# Patient Record
Sex: MALE | Race: WHITE | NOT HISPANIC OR LATINO | Employment: FULL TIME | URBAN - METROPOLITAN AREA
[De-identification: names, ages, dates, MRNs, and addresses within clinical notes are randomized per-mention and may not be internally consistent; named-entity substitution may affect disease eponyms.]

---

## 2021-01-04 ENCOUNTER — OFFICE VISIT (OUTPATIENT)
Dept: SLEEP MEDICINE | Facility: CLINIC | Age: 31
End: 2021-01-04
Payer: COMMERCIAL

## 2021-01-04 VITALS
BODY MASS INDEX: 29.63 KG/M2 | HEART RATE: 73 BPM | HEIGHT: 70 IN | DIASTOLIC BLOOD PRESSURE: 84 MMHG | WEIGHT: 207 LBS | SYSTOLIC BLOOD PRESSURE: 130 MMHG

## 2021-01-04 DIAGNOSIS — G47.33 OSA (OBSTRUCTIVE SLEEP APNEA): Primary | ICD-10-CM

## 2021-01-04 PROCEDURE — 99204 OFFICE O/P NEW MOD 45 MIN: CPT | Mod: S$GLB,,, | Performed by: INTERNAL MEDICINE

## 2021-01-04 PROCEDURE — 99999 PR PBB SHADOW E&M-NEW PATIENT-LVL III: ICD-10-PCS | Mod: PBBFAC,,, | Performed by: INTERNAL MEDICINE

## 2021-01-04 PROCEDURE — 1126F AMNT PAIN NOTED NONE PRSNT: CPT | Mod: S$GLB,,, | Performed by: INTERNAL MEDICINE

## 2021-01-04 PROCEDURE — 3008F BODY MASS INDEX DOCD: CPT | Mod: CPTII,S$GLB,, | Performed by: INTERNAL MEDICINE

## 2021-01-04 PROCEDURE — 3008F PR BODY MASS INDEX (BMI) DOCUMENTED: ICD-10-PCS | Mod: CPTII,S$GLB,, | Performed by: INTERNAL MEDICINE

## 2021-01-04 PROCEDURE — 99999 PR PBB SHADOW E&M-NEW PATIENT-LVL III: CPT | Mod: PBBFAC,,, | Performed by: INTERNAL MEDICINE

## 2021-01-04 PROCEDURE — 99204 PR OFFICE/OUTPT VISIT, NEW, LEVL IV, 45-59 MIN: ICD-10-PCS | Mod: S$GLB,,, | Performed by: INTERNAL MEDICINE

## 2021-01-04 PROCEDURE — 1126F PR PAIN SEVERITY QUANTIFIED, NO PAIN PRESENT: ICD-10-PCS | Mod: S$GLB,,, | Performed by: INTERNAL MEDICINE

## 2021-01-04 RX ORDER — FINASTERIDE 1 MG/1
TABLET, FILM COATED ORAL
COMMUNITY
Start: 2020-12-31

## 2021-01-06 ENCOUNTER — PATIENT MESSAGE (OUTPATIENT)
Dept: SLEEP MEDICINE | Facility: CLINIC | Age: 31
End: 2021-01-06

## 2021-01-07 ENCOUNTER — TELEPHONE (OUTPATIENT)
Dept: SLEEP MEDICINE | Facility: OTHER | Age: 31
End: 2021-01-07

## 2021-01-08 ENCOUNTER — PATIENT MESSAGE (OUTPATIENT)
Dept: SLEEP MEDICINE | Facility: CLINIC | Age: 31
End: 2021-01-08

## 2021-01-08 ENCOUNTER — TELEPHONE (OUTPATIENT)
Dept: SLEEP MEDICINE | Facility: OTHER | Age: 31
End: 2021-01-08

## 2021-01-22 ENCOUNTER — PATIENT MESSAGE (OUTPATIENT)
Dept: SLEEP MEDICINE | Facility: CLINIC | Age: 31
End: 2021-01-22

## 2021-01-22 ENCOUNTER — TELEPHONE (OUTPATIENT)
Dept: SLEEP MEDICINE | Facility: OTHER | Age: 31
End: 2021-01-22

## 2021-01-25 ENCOUNTER — HOSPITAL ENCOUNTER (OUTPATIENT)
Dept: SLEEP MEDICINE | Facility: OTHER | Age: 31
Discharge: HOME | End: 2021-01-25
Attending: INTERNAL MEDICINE
Payer: COMMERCIAL

## 2021-01-25 DIAGNOSIS — G47.33 OSA (OBSTRUCTIVE SLEEP APNEA): ICD-10-CM

## 2021-01-25 PROCEDURE — 95800 SLP STDY UNATTENDED: CPT

## 2021-01-29 ENCOUNTER — TELEPHONE (OUTPATIENT)
Dept: SLEEP MEDICINE | Facility: CLINIC | Age: 31
End: 2021-01-29

## 2021-02-01 ENCOUNTER — TELEPHONE (OUTPATIENT)
Dept: SLEEP MEDICINE | Facility: CLINIC | Age: 31
End: 2021-02-01

## 2021-02-01 DIAGNOSIS — G47.33 OSA (OBSTRUCTIVE SLEEP APNEA): Primary | ICD-10-CM

## 2021-03-30 ENCOUNTER — OFFICE VISIT (OUTPATIENT)
Dept: SLEEP MEDICINE | Facility: CLINIC | Age: 31
End: 2021-03-30
Payer: COMMERCIAL

## 2021-03-30 VITALS
DIASTOLIC BLOOD PRESSURE: 85 MMHG | WEIGHT: 207 LBS | BODY MASS INDEX: 29.63 KG/M2 | HEIGHT: 70 IN | SYSTOLIC BLOOD PRESSURE: 123 MMHG | HEART RATE: 67 BPM

## 2021-03-30 DIAGNOSIS — G47.33 OSA (OBSTRUCTIVE SLEEP APNEA): Primary | ICD-10-CM

## 2021-03-30 DIAGNOSIS — G47.10 HYPERSOMNOLENCE: ICD-10-CM

## 2021-03-30 DIAGNOSIS — J30.9 ALLERGIC SINUSITIS: ICD-10-CM

## 2021-03-30 PROCEDURE — 1126F PR PAIN SEVERITY QUANTIFIED, NO PAIN PRESENT: ICD-10-PCS | Mod: S$GLB,,, | Performed by: INTERNAL MEDICINE

## 2021-03-30 PROCEDURE — 3008F BODY MASS INDEX DOCD: CPT | Mod: CPTII,S$GLB,, | Performed by: INTERNAL MEDICINE

## 2021-03-30 PROCEDURE — 3008F PR BODY MASS INDEX (BMI) DOCUMENTED: ICD-10-PCS | Mod: CPTII,S$GLB,, | Performed by: INTERNAL MEDICINE

## 2021-03-30 PROCEDURE — 99214 OFFICE O/P EST MOD 30 MIN: CPT | Mod: S$GLB,,, | Performed by: INTERNAL MEDICINE

## 2021-03-30 PROCEDURE — 99214 PR OFFICE/OUTPT VISIT, EST, LEVL IV, 30-39 MIN: ICD-10-PCS | Mod: S$GLB,,, | Performed by: INTERNAL MEDICINE

## 2021-03-30 PROCEDURE — 99999 PR PBB SHADOW E&M-EST. PATIENT-LVL III: ICD-10-PCS | Mod: PBBFAC,,, | Performed by: INTERNAL MEDICINE

## 2021-03-30 PROCEDURE — 99999 PR PBB SHADOW E&M-EST. PATIENT-LVL III: CPT | Mod: PBBFAC,,, | Performed by: INTERNAL MEDICINE

## 2021-03-30 PROCEDURE — 1126F AMNT PAIN NOTED NONE PRSNT: CPT | Mod: S$GLB,,, | Performed by: INTERNAL MEDICINE

## 2021-04-16 ENCOUNTER — PATIENT MESSAGE (OUTPATIENT)
Dept: RESEARCH | Facility: HOSPITAL | Age: 31
End: 2021-04-16

## 2024-10-30 ENCOUNTER — TELEPHONE (OUTPATIENT)
Dept: OBSTETRICS AND GYNECOLOGY | Facility: CLINIC | Age: 34
End: 2024-10-30
Payer: COMMERCIAL

## 2024-10-30 DIAGNOSIS — R86.8 ASTHENOSPERMIA: Primary | ICD-10-CM

## 2024-11-01 NOTE — PROGRESS NOTES
The patient location is: [place]  The chief complaint leading to consultation is: trouble with sleep    Visit type: audiovisual    10 minutes of total time spent on the encounter, which includes face to face time and non-face to face time preparing to see the patient (eg, review of tests), Obtaining and/or reviewing separately obtained history, Documenting clinical information in the electronic or other health record, Independently interpreting results (not separately reported) and communicating results to the patient/family/caregiver, or Care coordination (not separately reported).     Each patient to whom he or she provides medical services by telemedicine is:  (1) informed of the relationship between the physician and patient and the respective role of any other health care provider with respect to management of the patient; and (2) notified that he or she may decline to receive medical services by telemedicine and may withdraw from such care at any time.      FOLLOW-UP VISIT    CC: CESAR follow-up.     Norm Harp  is a pleasant 33 y.o. male established with the [facility]    Here today for:  follow-up     Since last visit:   See assessment below    SLEEP SCHEDULE   Duration    Wind- down    Envmnt    CBTi    Meds prior    Meds now    Bed Time 11p (BT 2AM)   Lights out    Latency 30min   Arousals 1-2   Back to sleep    Stim. ctrl    Wake time 9AM   Caffeine    Naps Occasional in afternoon   Nocturia 0-1   Work                  No past medical history on file.  Patient Active Problem List   Diagnosis    CESAR (obstructive sleep apnea)       Current Outpatient Medications:     finasteride (PROPECIA) 1 mg tablet, , Disp: , Rfl:      There were no vitals filed for this visit.  Physical Exam:    GEN:   Well-appearing  Psych:  Appropriate affect, demonstrates insight  SKIN:  No rash on the face or bridge of the nose      LABS:   Lab Results   Component Value Date    CO2 26 06/03/2024         RECORDS  "REVIEWED:      HST 1.25.2021: AHI 10, RDI =20, <90% 0.2%  Started auto 6-12:    DL 3.29.21: 28/30, 7h 16min, auto 6-12 (8.1/10.6/11.6), leak 16.5, AHI 2.7    ASSESSMENT    Sig PMH:  PROBLEM DESCRIPTION/ Sx on Presentation Interval HX STATUS PLAN     CESAR   Presentation:  resented Jan 2021 for evaluation for witnessed snoring and apneic events    Doing well, wearing nightly, more rested.      PAP history   Dx Study    Mask Nasal pillows   DME HME   My Air    CPAP age 2/12/21 compliant AV!   PAP altn    Benefits Sleeping better    CPAP making a "huge difference"    No daytime drowsiness anymore   PROBS none            10.30.24: 30/30 x 6h 42min, 8-14 (9.8/11.7/12.8), leak 3/9/19, AHI 0.8    Using nightly, sleeps much better with CPAP     controlled       PAP PLAN   E min 8 cwp (incr to 10)   I max 14 cwp ()   PS/epr    RAMP 6 x 20min (incr to 8 x 10)   Other    Altn.        Supplies ordered        The patient is using and benefitting from PAP therapy     Other issues: allergic rhinitis (controlled with prn antihistamine)    RTC:  yearly or sooner if problems arise        "

## 2024-11-06 ENCOUNTER — OFFICE VISIT (OUTPATIENT)
Dept: SLEEP MEDICINE | Facility: CLINIC | Age: 34
End: 2024-11-06
Payer: COMMERCIAL

## 2024-11-06 DIAGNOSIS — G47.33 OSA (OBSTRUCTIVE SLEEP APNEA): Primary | ICD-10-CM

## 2024-11-06 PROCEDURE — 99213 OFFICE O/P EST LOW 20 MIN: CPT | Mod: 95,,, | Performed by: INTERNAL MEDICINE

## 2024-11-29 ENCOUNTER — TELEPHONE (OUTPATIENT)
Dept: OBSTETRICS AND GYNECOLOGY | Facility: CLINIC | Age: 34
End: 2024-11-29
Payer: COMMERCIAL

## 2024-11-29 DIAGNOSIS — R86.9 ABNORMAL SEMEN ANALYSIS: Primary | ICD-10-CM

## 2024-11-29 NOTE — TELEPHONE ENCOUNTER
Semen analysis abnormal x 2 with recommendation for urology consult. Referral placed after reviewed results/recommendations with spouse.

## 2024-12-12 ENCOUNTER — OFFICE VISIT (OUTPATIENT)
Dept: UROLOGY | Facility: CLINIC | Age: 34
End: 2024-12-12
Payer: COMMERCIAL

## 2024-12-12 VITALS
BODY MASS INDEX: 32.07 KG/M2 | DIASTOLIC BLOOD PRESSURE: 80 MMHG | HEIGHT: 70 IN | SYSTOLIC BLOOD PRESSURE: 120 MMHG | WEIGHT: 224 LBS

## 2024-12-12 DIAGNOSIS — E29.1 TESTICULAR FAILURE: ICD-10-CM

## 2024-12-12 PROCEDURE — 1159F MED LIST DOCD IN RCRD: CPT | Mod: CPTII,S$GLB,, | Performed by: UROLOGY

## 2024-12-12 PROCEDURE — 1160F RVW MEDS BY RX/DR IN RCRD: CPT | Mod: CPTII,S$GLB,, | Performed by: UROLOGY

## 2024-12-12 PROCEDURE — 3079F DIAST BP 80-89 MM HG: CPT | Mod: CPTII,S$GLB,, | Performed by: UROLOGY

## 2024-12-12 PROCEDURE — 99204 OFFICE O/P NEW MOD 45 MIN: CPT | Mod: S$GLB,,, | Performed by: UROLOGY

## 2024-12-12 PROCEDURE — 3074F SYST BP LT 130 MM HG: CPT | Mod: CPTII,S$GLB,, | Performed by: UROLOGY

## 2024-12-12 PROCEDURE — 99999 PR PBB SHADOW E&M-EST. PATIENT-LVL III: CPT | Mod: PBBFAC,,, | Performed by: UROLOGY

## 2024-12-12 PROCEDURE — 3008F BODY MASS INDEX DOCD: CPT | Mod: CPTII,S$GLB,, | Performed by: UROLOGY

## 2024-12-12 NOTE — LETTER
December 12, 2024        Rani Seaman MD  [address]  [address]  [place] [address]             [facility]  [address]  [place] [address]  Phone: [phone]   Patient: Norm Harp   MR Number: [id number]   YOB: 1990   Date of Visit: 12/12/2024       Dear Dr. Seaman:    Thank you for referring Norm Harp to me for evaluation. Attached you will find relevant portions of my assessment and plan of care.    If you have questions, please do not hesitate to call me. I look forward to following Norm Harp along with you.    Sincerely,      Manny Callahan MD            CC  No Recipients    Enclosure

## 2024-12-12 NOTE — PROGRESS NOTES
"Chief Complaint:  Infertility    HPI:    Mr. Harp is a 33 y.o.  male who has been  to his wife for the past 2 years. They have been trying to achieve a pregnancy for the past 1 years but without success. 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 He denies a history of erectile dysfunction and ejaculatory problems.    He has achieved 0 pregnancies in the past.    HORMONE PANEL 12/10/24  T--248  FSH--2.0  LH--2.9    SA 10/28/24 (ANGELITO)--1.6 cc/71 million per cc/38%/4%  SA 24 (ANGELITO)--1.3 cc/82 million per cc/60.5%/4%    Linda Harp is 30 years old. ( [birth year]) Her menses are regular. She has not undergone prior hysterosalpingogram. She has achieved 0 prior pregnancies.  She sees Dr. Seaman.    The couple has not undergone prior intrauterine insemination procedures.    The couple has not undergone prior in-vitro fertilization procedures.    Norm Harp denies a history of exposure to harmful chemicals, toxins, and radiation.    No history of recent fevers greater than 101.5 degrees Farenheit.    +  history of recent exposure to "wet heat." + hot tub    No history of urological trauma or testicular torsion.    No history of prostatitis, epididymitis, and orchitis.    No history of post-pubertal mumps.    There is no known family history of fertility problems.    REVIEW OF SYSTEMS:     He denies headache, blurred vision, fever, nausea, vomiting, chills, abdominal pain, chest pain, sore throat, bleeding per rectum, cough, SOB, recent loss of consciousness, recent mental status changes, seizures, dizziness, or upper or lower extremity weakness.    PHYSICAL EXAM:     The patient generally appears in good health, is appropriately interactive, and is in no apparent distress.     Eyes: anicteric sclerae, moist conjunctivae; no lid-lag; PERRLA     HENT: Atraumatic; oropharynx clear with moist mucous membranes and no " "mucosal ulcerations;normal hard and soft palate.  No evidence of lymphadenopathy.    Neck: Trachea midline.  No thyromegaly.    Skin: No lesions.    Mental: Cooperative with normal affect.  Is oriented to time, place, and person.    Neuro: Grossly intact.    Chest: Normal inspiratory effort.   No accessory muscles.  No audible wheezes.  Respirations symmetric on inspiration and expiration.    Heart: Regular rhythm.      Abdomen:  Soft, non-tender. No masses or organomegaly. Bladder is not palpable. No evidence of flank discomfort. No evidence of inguinal hernia.    Genitourinary: Penis is normal with no evidence of plaques or induration. Urethral meatus is normal. Scrotum is normal. Testes are descended bilaterally with no evidence of abnormal masses or tenderness. Epididymis, vas deferens, and cord structures are normal bilaterally.  Testicular volume is approximately 18 cc bilaterally.    Extremities: No cyanosis, clubbing, or edema.    IMPRESSION & PLAN:    Mr. Harp is a 33 y.o.  male who has been  to his wife for the past 2 years. They have been trying to achieve a pregnancy for the past 1 years but without success. 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 He denies a history of erectile dysfunction and ejaculatory problems.    He has achieved 0 pregnancies in the past.    HORMONE PANEL 12/10/24  T--248  FSH--2.0  LH--2.9    SA 10/28/24 (ANGELITO)--1.6 cc/71 million per cc/38%/4%  SA 11/20/24 (ANGELITO)--1.3 cc/82 million per cc/60.5%/4%    1.   testosterone, prolactin, and estradiol serum levels in the AM.  2.  Independently interpreted his labs and outside SA's today.   3.  Return to the clinic in 1 weeks to discuss test results and treatment plan.  4.  Recommend avoiding "wet heat."  5.  Recommend taking a multivitamin and 500 mg of vitamin c daily in addition to the multivitamin.  6.  Please send a copy of the note to " Dr. Seaman.  Thank you for the consultation.    CC: Jurgen

## 2024-12-16 ENCOUNTER — PATIENT MESSAGE (OUTPATIENT)
Dept: UROLOGY | Facility: CLINIC | Age: 34
End: 2024-12-16
Payer: COMMERCIAL

## 2024-12-17 ENCOUNTER — LAB VISIT (OUTPATIENT)
Dept: LAB | Facility: HOSPITAL | Age: 34
End: 2024-12-17
Attending: UROLOGY
Payer: COMMERCIAL

## 2024-12-17 DIAGNOSIS — E29.1 TESTICULAR FAILURE: ICD-10-CM

## 2024-12-17 LAB
ESTRADIOL SERPL-MCNC: 35 PG/ML (ref 11–44)
PROLACTIN SERPL IA-MCNC: 7.7 NG/ML (ref 3.5–19.4)
TESTOST SERPL-MCNC: 286 NG/DL (ref 304–1227)

## 2024-12-17 PROCEDURE — 36415 COLL VENOUS BLD VENIPUNCTURE: CPT | Performed by: UROLOGY

## 2024-12-17 PROCEDURE — 84403 ASSAY OF TOTAL TESTOSTERONE: CPT | Performed by: UROLOGY

## 2024-12-17 PROCEDURE — 82670 ASSAY OF TOTAL ESTRADIOL: CPT | Performed by: UROLOGY

## 2024-12-17 PROCEDURE — 84146 ASSAY OF PROLACTIN: CPT | Performed by: UROLOGY

## 2024-12-19 ENCOUNTER — OFFICE VISIT (OUTPATIENT)
Dept: UROLOGY | Facility: CLINIC | Age: 34
End: 2024-12-19
Payer: COMMERCIAL

## 2024-12-19 VITALS
HEART RATE: 94 BPM | DIASTOLIC BLOOD PRESSURE: 79 MMHG | SYSTOLIC BLOOD PRESSURE: 124 MMHG | WEIGHT: 224.19 LBS | BODY MASS INDEX: 32.17 KG/M2

## 2024-12-19 DIAGNOSIS — E29.1 TESTICULAR FAILURE: Primary | ICD-10-CM

## 2024-12-19 PROCEDURE — 3008F BODY MASS INDEX DOCD: CPT | Mod: CPTII,S$GLB,, | Performed by: UROLOGY

## 2024-12-19 PROCEDURE — 3074F SYST BP LT 130 MM HG: CPT | Mod: CPTII,S$GLB,, | Performed by: UROLOGY

## 2024-12-19 PROCEDURE — 99999 PR PBB SHADOW E&M-EST. PATIENT-LVL III: CPT | Mod: PBBFAC,,, | Performed by: UROLOGY

## 2024-12-19 PROCEDURE — 3078F DIAST BP <80 MM HG: CPT | Mod: CPTII,S$GLB,, | Performed by: UROLOGY

## 2024-12-19 PROCEDURE — 99214 OFFICE O/P EST MOD 30 MIN: CPT | Mod: S$GLB,,, | Performed by: UROLOGY

## 2024-12-19 PROCEDURE — 1159F MED LIST DOCD IN RCRD: CPT | Mod: CPTII,S$GLB,, | Performed by: UROLOGY

## 2024-12-19 RX ORDER — CLOMIPHENE CITRATE 50 MG/1
TABLET ORAL
Qty: 15 TABLET | Refills: 5 | Status: SHIPPED | OUTPATIENT
Start: 2024-12-19

## 2024-12-19 NOTE — PROGRESS NOTES
"Chief Complaint:  Infertility    HPI:    Mr. Harp is a 34 y.o.  male who has been  to his wife for the past 2 years. They have been trying to achieve a pregnancy for the past 1 years but without success. 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 He denies a history of erectile dysfunction and ejaculatory problems.      Lab Results   Component Value Date    TOTALTESTOST 286 (L) 2024    ESTRADIOL 35 2024    PROLACTIN 7.7 2024    HORMONE PANEL 12/10/24  T--248  FSH--2.0  LH--2.9    SA 10/28/24 (ANGELITO)--1.6 cc/71 million per cc/38%/4%  SA 24 (ANGELITO)--1.3 cc/82 million per cc/60.5%/4%    FOR REVIEW FROM PREVIOUS:    Mr. Harp is a 33 y.o.  male who has been  to his wife for the past 2 years. They have been trying to achieve a pregnancy for the past 1 years but without success. 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 He denies a history of erectile dysfunction and ejaculatory problems.    He has achieved 0 pregnancies in the past.    HORMONE PANEL 12/10/24  T--248  FSH--2.0  LH--2.9    SA 10/28/24 (ANGELITO)--1.6 cc/71 million per cc/38%/4%  SA 24 (ANGELITO)--1.3 cc/82 million per cc/60.5%/4%    Linda Harp is 30 years old. ( 89) Her menses are regular. She has not undergone prior hysterosalpingogram. She has achieved 0 prior pregnancies.  She sees Dr. Seaman.    The couple has not undergone prior intrauterine insemination procedures.    The couple has not undergone prior in-vitro fertilization procedures.    Norm Harp denies a history of exposure to harmful chemicals, toxins, and radiation.    No history of recent fevers greater than 101.5 degrees Farenheit.    +  history of recent exposure to "wet heat." + hot tub    No history of urological trauma or testicular torsion.    No history of " prostatitis, epididymitis, and orchitis.    No history of post-pubertal mumps.    There is no known family history of fertility problems.    REVIEW OF SYSTEMS:     He denies headache, blurred vision, fever, nausea, vomiting, chills, abdominal pain, chest pain, sore throat, bleeding per rectum, cough, SOB, recent loss of consciousness, recent mental status changes, seizures, dizziness, or upper or lower extremity weakness.    PHYSICAL EXAM:     The patient generally appears in good health, is appropriately interactive, and is in no apparent distress.     Eyes: anicteric sclerae, moist conjunctivae; no lid-lag; PERRLA     HENT: Atraumatic; oropharynx clear with moist mucous membranes and no mucosal ulcerations;normal hard and soft palate.  No evidence of lymphadenopathy.    Neck: Trachea midline.  No thyromegaly.    Skin: No lesions.    Mental: Cooperative with normal affect.  Is oriented to time, place, and person.    Neuro: Grossly intact.    Chest: Normal inspiratory effort.   No accessory muscles.  No audible wheezes.  Respirations symmetric on inspiration and expiration.    Heart: Regular rhythm.      Abdomen:  Soft, non-tender. No masses or organomegaly. Bladder is not palpable. No evidence of flank discomfort. No evidence of inguinal hernia.    Genitourinary: Penis is normal with no evidence of plaques or induration. Urethral meatus is normal. Scrotum is normal. Testes are descended bilaterally with no evidence of abnormal masses or tenderness. Epididymis, vas deferens, and cord structures are normal bilaterally.  Testicular volume is approximately 18 cc bilaterally.    Extremities: No cyanosis, clubbing, or edema.    IMPRESSION & PLAN:    Mr. Harp is a 33 y.o.  male who has been  to his wife for the past 2 years. They have been trying to achieve a pregnancy for the past 1 years but without success. 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 He denies a history of erectile dysfunction and ejaculatory problems.    He has achieved 0 pregnancies in the past.    HORMONE PANEL 12/10/24  T--248  FSH--2.0  LH--2.9    SA 10/28/24 (ANGELITO)--1.6 cc/71 million per cc/38%/4%  SA 11/20/24 (ANGELITO)--1.3 cc/82 million per cc/60.5%/4%    1.   Independently interpreted his labs and outside SA's today.   2.  His T is low.  Will start clomid. Side effects discussed.  A new Rx was given.  Will check a T in 1 month.  3. RTC 3 months with a SA x 1.  4.  Recommend avoiding "wet heat."  5.  Recommend taking a multivitamin and 500 mg of vitamin c daily in addition to the multivitamin.      CC: Jurgen        "

## 2024-12-19 NOTE — LETTER
December 19, 2024        Rani Seaman MD  [address]  [address]  [place] [address]             [facility]th [facility]  [address]  [place] [address]  Phone: [phone]   Patient: Norm Harp   MR Number: [id number]   YOB: 1990   Date of Visit: 12/19/2024       Dear Dr. Seaman:    Thank you for referring Norm Harp to me for evaluation. Attached you will find relevant portions of my assessment and plan of care.    If you have questions, please do not hesitate to call me. I look forward to following Norm Harp along with you.    Sincerely,      Manny Callahan MD            CC  No Recipients    Enclosure

## 2024-12-23 ENCOUNTER — TELEPHONE (OUTPATIENT)
Dept: UROLOGY | Facility: CLINIC | Age: 34
End: 2024-12-23
Payer: COMMERCIAL

## 2024-12-23 NOTE — TELEPHONE ENCOUNTER
Call placed to pt regarding prescription. Pt stated he no longer needs to switch pharmacies, as he is back in town.     ----- Message from Med Assistant Gamboa sent at 12/20/2024 10:13 AM CST -----  Regarding: FW: Medication Issue  Contact: IVETTE BUI [[id number]]    ----- Message -----  From: Jodi Quiñonez  Sent: 12/20/2024   9:52 AM CST  To: [facility]  Subject: Medication Issue                                 CONSULT/ADVISORY    Name of Caller: IVETTE BUI [[id number]]    Contact Preference: [phone] (home)       Nature of Call:  Pt states Dr. Callahan sent a RX to his preferred  Pharmacy and they said it was out of stock.  Pt wants it to be resent to:    [facility]  [address]  [place]  [address]  [phone]

## 2025-01-27 ENCOUNTER — PATIENT MESSAGE (OUTPATIENT)
Dept: UROLOGY | Facility: CLINIC | Age: 35
End: 2025-01-27
Payer: COMMERCIAL

## 2025-01-28 ENCOUNTER — LAB VISIT (OUTPATIENT)
Dept: LAB | Facility: HOSPITAL | Age: 35
End: 2025-01-28
Attending: UROLOGY
Payer: COMMERCIAL

## 2025-01-28 ENCOUNTER — PATIENT MESSAGE (OUTPATIENT)
Dept: UROLOGY | Facility: CLINIC | Age: 35
End: 2025-01-28
Payer: COMMERCIAL

## 2025-01-28 ENCOUNTER — TELEPHONE (OUTPATIENT)
Dept: UROLOGY | Facility: CLINIC | Age: 35
End: 2025-01-28
Payer: COMMERCIAL

## 2025-01-28 DIAGNOSIS — E29.1 TESTICULAR FAILURE: ICD-10-CM

## 2025-01-28 LAB — TESTOST SERPL-MCNC: 603 NG/DL (ref 304–1227)

## 2025-01-28 PROCEDURE — 36415 COLL VENOUS BLD VENIPUNCTURE: CPT | Performed by: UROLOGY

## 2025-01-28 PROCEDURE — 84403 ASSAY OF TOTAL TESTOSTERONE: CPT | Performed by: UROLOGY

## 2025-01-28 NOTE — TELEPHONE ENCOUNTER
Call was placed back to patient no answer left VM.       Please notify T looks good.  Continue clomid.  RTC as scheduled.

## 2025-02-25 ENCOUNTER — PATIENT MESSAGE (OUTPATIENT)
Dept: UROLOGY | Facility: CLINIC | Age: 35
End: 2025-02-25
Payer: COMMERCIAL

## 2025-03-20 ENCOUNTER — OFFICE VISIT (OUTPATIENT)
Dept: UROLOGY | Facility: CLINIC | Age: 35
End: 2025-03-20
Payer: COMMERCIAL

## 2025-03-20 VITALS
WEIGHT: 214.5 LBS | SYSTOLIC BLOOD PRESSURE: 126 MMHG | DIASTOLIC BLOOD PRESSURE: 85 MMHG | HEART RATE: 75 BPM | BODY MASS INDEX: 30.78 KG/M2

## 2025-03-20 DIAGNOSIS — E29.1 TESTICULAR FAILURE: Primary | ICD-10-CM

## 2025-03-20 PROCEDURE — 99999 PR PBB SHADOW E&M-EST. PATIENT-LVL III: CPT | Mod: PBBFAC,,, | Performed by: UROLOGY

## 2025-03-20 PROCEDURE — 3079F DIAST BP 80-89 MM HG: CPT | Mod: CPTII,S$GLB,, | Performed by: UROLOGY

## 2025-03-20 PROCEDURE — 3074F SYST BP LT 130 MM HG: CPT | Mod: CPTII,S$GLB,, | Performed by: UROLOGY

## 2025-03-20 PROCEDURE — 1159F MED LIST DOCD IN RCRD: CPT | Mod: CPTII,S$GLB,, | Performed by: UROLOGY

## 2025-03-20 PROCEDURE — 3008F BODY MASS INDEX DOCD: CPT | Mod: CPTII,S$GLB,, | Performed by: UROLOGY

## 2025-03-20 PROCEDURE — 99214 OFFICE O/P EST MOD 30 MIN: CPT | Mod: S$GLB,,, | Performed by: UROLOGY

## 2025-03-20 RX ORDER — CLOMIPHENE CITRATE 50 MG/1
TABLET ORAL
Qty: 15 TABLET | Refills: 5 | Status: SHIPPED | OUTPATIENT
Start: 2025-03-20

## 2025-03-20 NOTE — PROGRESS NOTES
Chief Complaint:  Infertility    HPI:    Mr. Harp is a 34 y.o.  male who has been  to his wife for the past 2 years. They have been trying to achieve a pregnancy for the past 1 years but without success. 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 He denies a history of erectile dysfunction and ejaculatory problems.    He was started on clomid for low T.  SA on clomid shows an isolated morphology defect      Lab Results   Component Value Date    TOTALTESTOST 603 2025    ESTRADIOL 35 2024    PROLACTIN 7.7 2024    HORMONE PANEL 12/10/24  T--248  FSH--2.0  LH--2.9    SA 10/28/24 (ANGELITO)--1.6 cc/71 million per cc/38%/4%  SA 24 (ANGELITO)--1.3 cc/82 million per cc/60.5%/4%  SA 3/13/25 (ANGELITO)--3.3 cc/80 million per cc/59%/10%    FOR REVIEW FROM PREVIOUS:      Mr. Harp is a 34 y.o.  male who has been  to his wife for the past 2 years. They have been trying to achieve a pregnancy for the past 1 years but without success. 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 He denies a history of erectile dysfunction and ejaculatory problems.      Lab Results   Component Value Date    TOTALTESTOST 603 2025    ESTRADIOL 35 2024    PROLACTIN 7.7 2024    HORMONE PANEL 12/10/24  T--248  FSH--2.0  LH--2.9    SA 10/28/24 (ANGELITO)--1.6 cc/71 million per cc/38%/4%  SA 24 (ANGELITO)--1.3 cc/82 million per cc/60.5%/4%    Linda Harp is 30 years old. ( 89) Her menses are regular. She has not undergone prior hysterosalpingogram. She has achieved 0 prior pregnancies.  She sees Dr. Seaman.    The couple has not undergone prior intrauterine insemination procedures.    The couple has not undergone prior in-vitro fertilization procedures.    Norm Harp denies a history of exposure to harmful chemicals, toxins,  "and radiation.    No history of recent fevers greater than 101.5 degrees Farenheit.    +  history of recent exposure to "wet heat." + hot tub    No history of urological trauma or testicular torsion.    No history of prostatitis, epididymitis, and orchitis.    No history of post-pubertal mumps.    There is no known family history of fertility problems.    REVIEW OF SYSTEMS:     He denies headache, blurred vision, fever, nausea, vomiting, chills, abdominal pain, chest pain, sore throat, bleeding per rectum, cough, SOB, recent loss of consciousness, recent mental status changes, seizures, dizziness, or upper or lower extremity weakness.    PHYSICAL EXAM:     The patient generally appears in good health, is appropriately interactive, and is in no apparent distress.     Eyes: anicteric sclerae, moist conjunctivae; no lid-lag; PERRLA     HENT: Atraumatic; oropharynx clear with moist mucous membranes and no mucosal ulcerations;normal hard and soft palate.  No evidence of lymphadenopathy.    Neck: Trachea midline.  No thyromegaly.    Skin: No lesions.    Mental: Cooperative with normal affect.  Is oriented to time, place, and person.    Neuro: Grossly intact.    Chest: Normal inspiratory effort.   No accessory muscles.  No audible wheezes.  Respirations symmetric on inspiration and expiration.    Heart: Regular rhythm.      Abdomen:  Soft, non-tender. No masses or organomegaly. Bladder is not palpable. No evidence of flank discomfort. No evidence of inguinal hernia.    Genitourinary: Penis is normal with no evidence of plaques or induration. Urethral meatus is normal. Scrotum is normal. Testes are descended bilaterally with no evidence of abnormal masses or tenderness. Epididymis, vas deferens, and cord structures are normal bilaterally.  Testicular volume is approximately 18 cc bilaterally.    Extremities: No cyanosis, clubbing, or edema.    IMPRESSION & PLAN:    Mr. Harp is a 34 y.o.  male who has been  to his " "wife for the past 2 years. They have been trying to achieve a pregnancy for the past 1 years but without success. 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 He denies a history of erectile dysfunction and ejaculatory problems.    He was started on clomid for low T.  SA on clomid shows an isolated morphology defect      Lab Results   Component Value Date    TOTALTESTOST 603 01/28/2025    ESTRADIOL 35 12/17/2024    PROLACTIN 7.7 12/17/2024    HORMONE PANEL 12/10/24  T--248  FSH--2.0  LH--2.9    SA 10/28/24 (ANGELITO)--1.6 cc/71 million per cc/38%/4%  SA 11/20/24 (ANGELITO)--1.3 cc/82 million per cc/60.5%/4%  SA 3/13/25 (ANGELITO)--3.3 cc/80 million per cc/59%/10%    1.   Independently interpreted his labs and outside SA's today.   2.  His T is low.  Cloninue clomid.  Refilled.  3.  From a male factor perspective efforts with natural means, IUI, and IVF are all appropriate.  4.  Recommend avoiding "wet heat."  5.  Recommend taking a multivitamin and 500 mg of vitamin c daily in addition to the multivitamin.  6. RTC 6 months if not pregnant.      CC: Jurgen          "

## 2025-04-21 ENCOUNTER — PATIENT MESSAGE (OUTPATIENT)
Dept: SLEEP MEDICINE | Facility: CLINIC | Age: 35
End: 2025-04-21
Payer: COMMERCIAL

## 2025-05-13 ENCOUNTER — PATIENT MESSAGE (OUTPATIENT)
Dept: UROLOGY | Facility: CLINIC | Age: 35
End: 2025-05-13
Payer: COMMERCIAL

## 2025-06-16 ENCOUNTER — TELEPHONE (OUTPATIENT)
Dept: UROLOGY | Facility: CLINIC | Age: 35
End: 2025-06-16
Payer: COMMERCIAL

## 2025-06-16 NOTE — TELEPHONE ENCOUNTER
Call placed to pt. Pt informed refills are available on the Rx, should reach out to pharmacy for . Pt verbalized understanding. Pt inquired about alternative medication, pt informed there is not an alternative that Dr. Callahan can prescribe.

## 2025-06-16 NOTE — TELEPHONE ENCOUNTER
Call placed to pt regarding message to office for med refill; refills are available. Left VM with clinic #.